# Patient Record
Sex: MALE | Race: BLACK OR AFRICAN AMERICAN | Employment: FULL TIME | URBAN - METROPOLITAN AREA
[De-identification: names, ages, dates, MRNs, and addresses within clinical notes are randomized per-mention and may not be internally consistent; named-entity substitution may affect disease eponyms.]

---

## 2022-01-09 PROCEDURE — 99285 EMERGENCY DEPT VISIT HI MDM: CPT

## 2022-01-09 PROCEDURE — 51798 US URINE CAPACITY MEASURE: CPT

## 2022-01-09 PROCEDURE — 51702 INSERT TEMP BLADDER CATH: CPT

## 2022-01-09 ASSESSMENT — PAIN DESCRIPTION - LOCATION: LOCATION: PELVIS

## 2022-01-09 ASSESSMENT — PAIN DESCRIPTION - PAIN TYPE: TYPE: ACUTE PAIN

## 2022-01-09 ASSESSMENT — PAIN SCALES - GENERAL: PAINLEVEL_OUTOF10: 7

## 2022-01-10 ENCOUNTER — HOSPITAL ENCOUNTER (EMERGENCY)
Age: 67
Discharge: HOME | End: 2022-01-10
Attending: STUDENT IN AN ORGANIZED HEALTH CARE EDUCATION/TRAINING PROGRAM
Payer: COMMERCIAL

## 2022-01-10 ENCOUNTER — APPOINTMENT (OUTPATIENT)
Dept: CT IMAGING | Age: 67
End: 2022-01-10
Payer: COMMERCIAL

## 2022-01-10 VITALS
HEART RATE: 61 BPM | SYSTOLIC BLOOD PRESSURE: 125 MMHG | OXYGEN SATURATION: 97 % | BODY MASS INDEX: 24.65 KG/M2 | TEMPERATURE: 98.2 F | DIASTOLIC BLOOD PRESSURE: 71 MMHG | WEIGHT: 182 LBS | RESPIRATION RATE: 18 BRPM | HEIGHT: 72 IN

## 2022-01-10 DIAGNOSIS — R33.9 URINARY RETENTION: Primary | ICD-10-CM

## 2022-01-10 DIAGNOSIS — N30.01 ACUTE CYSTITIS WITH HEMATURIA: ICD-10-CM

## 2022-01-10 LAB
ALBUMIN SERPL-MCNC: 3.6 GM/DL (ref 3.4–5)
ALP BLD-CCNC: 84 IU/L (ref 40–129)
ALT SERPL-CCNC: 25 U/L (ref 10–40)
ANION GAP SERPL CALCULATED.3IONS-SCNC: 9 MMOL/L (ref 4–16)
AST SERPL-CCNC: 24 IU/L (ref 15–37)
BACTERIA: ABNORMAL /HPF
BASOPHILS ABSOLUTE: 0 K/CU MM
BASOPHILS RELATIVE PERCENT: 0.1 % (ref 0–1)
BILIRUB SERPL-MCNC: 0.3 MG/DL (ref 0–1)
BILIRUBIN URINE: NEGATIVE MG/DL
BLOOD, URINE: 300
BUN BLDV-MCNC: 25 MG/DL (ref 6–23)
CALCIUM SERPL-MCNC: 9.2 MG/DL (ref 8.3–10.6)
CHLORIDE BLD-SCNC: 105 MMOL/L (ref 99–110)
CLARITY: ABNORMAL
CO2: 23 MMOL/L (ref 21–32)
COLOR: ABNORMAL
CREAT SERPL-MCNC: 1 MG/DL (ref 0.9–1.3)
DIFFERENTIAL TYPE: ABNORMAL
EOSINOPHILS ABSOLUTE: 0 K/CU MM
EOSINOPHILS RELATIVE PERCENT: 0 % (ref 0–3)
GFR AFRICAN AMERICAN: >60 ML/MIN/1.73M2
GFR NON-AFRICAN AMERICAN: >60 ML/MIN/1.73M2
GLUCOSE BLD-MCNC: 151 MG/DL (ref 70–99)
GLUCOSE, URINE: NEGATIVE MG/DL
HCT VFR BLD CALC: 46.7 % (ref 42–52)
HEMOGLOBIN: 15 GM/DL (ref 13.5–18)
IMMATURE NEUTROPHIL %: 0.2 % (ref 0–0.43)
KETONES, URINE: NEGATIVE MG/DL
LEUKOCYTE ESTERASE, URINE: ABNORMAL
LIPASE: 19 IU/L (ref 13–60)
LYMPHOCYTES ABSOLUTE: 0.4 K/CU MM
LYMPHOCYTES RELATIVE PERCENT: 4.7 % (ref 24–44)
MCH RBC QN AUTO: 31.2 PG (ref 27–31)
MCHC RBC AUTO-ENTMCNC: 32.1 % (ref 32–36)
MCV RBC AUTO: 97.1 FL (ref 78–100)
MONOCYTES ABSOLUTE: 0.3 K/CU MM
MONOCYTES RELATIVE PERCENT: 3.3 % (ref 0–4)
NITRITE URINE, QUANTITATIVE: NEGATIVE
NUCLEATED RBC %: 0 %
PDW BLD-RTO: 13.5 % (ref 11.7–14.9)
PH, URINE: 6 (ref 5–8)
PLATELET # BLD: 268 K/CU MM (ref 140–440)
PMV BLD AUTO: 8.9 FL (ref 7.5–11.1)
POTASSIUM SERPL-SCNC: 4.1 MMOL/L (ref 3.5–5.1)
PROTEIN UA: 300 MG/DL
RBC # BLD: 4.81 M/CU MM (ref 4.6–6.2)
RBC URINE: 2618 /HPF (ref 0–3)
SEGMENTED NEUTROPHILS ABSOLUTE COUNT: 7.4 K/CU MM
SEGMENTED NEUTROPHILS RELATIVE PERCENT: 91.7 % (ref 36–66)
SODIUM BLD-SCNC: 137 MMOL/L (ref 135–145)
SPECIFIC GRAVITY UA: 1.01 (ref 1–1.03)
SQUAMOUS EPITHELIAL: 2 /HPF
TOTAL IMMATURE NEUTOROPHIL: 0.02 K/CU MM
TOTAL NUCLEATED RBC: 0 K/CU MM
TOTAL PROTEIN: 6.8 GM/DL (ref 6.4–8.2)
UROBILINOGEN, URINE: NORMAL MG/DL (ref 0.2–1)
WBC # BLD: 8.1 K/CU MM (ref 4–10.5)
WBC UA: 250 /HPF (ref 0–2)

## 2022-01-10 PROCEDURE — 87186 SC STD MICRODIL/AGAR DIL: CPT

## 2022-01-10 PROCEDURE — 74177 CT ABD & PELVIS W/CONTRAST: CPT

## 2022-01-10 PROCEDURE — 85025 COMPLETE CBC W/AUTO DIFF WBC: CPT

## 2022-01-10 PROCEDURE — 83690 ASSAY OF LIPASE: CPT

## 2022-01-10 PROCEDURE — 87077 CULTURE AEROBIC IDENTIFY: CPT

## 2022-01-10 PROCEDURE — 6360000004 HC RX CONTRAST MEDICATION: Performed by: STUDENT IN AN ORGANIZED HEALTH CARE EDUCATION/TRAINING PROGRAM

## 2022-01-10 PROCEDURE — 81001 URINALYSIS AUTO W/SCOPE: CPT

## 2022-01-10 PROCEDURE — 6370000000 HC RX 637 (ALT 250 FOR IP): Performed by: STUDENT IN AN ORGANIZED HEALTH CARE EDUCATION/TRAINING PROGRAM

## 2022-01-10 PROCEDURE — 80053 COMPREHEN METABOLIC PANEL: CPT

## 2022-01-10 PROCEDURE — 87086 URINE CULTURE/COLONY COUNT: CPT

## 2022-01-10 RX ORDER — CEPHALEXIN 500 MG/1
500 CAPSULE ORAL 4 TIMES DAILY
Qty: 40 CAPSULE | Refills: 0 | Status: SHIPPED | OUTPATIENT
Start: 2022-01-10 | End: 2022-01-20

## 2022-01-10 RX ORDER — CEPHALEXIN 250 MG/1
500 CAPSULE ORAL ONCE
Status: COMPLETED | OUTPATIENT
Start: 2022-01-10 | End: 2022-01-10

## 2022-01-10 RX ADMIN — CEPHALEXIN 500 MG: 250 CAPSULE ORAL at 03:11

## 2022-01-10 RX ADMIN — IOPAMIDOL 75 ML: 755 INJECTION, SOLUTION INTRAVENOUS at 02:22

## 2022-01-10 NOTE — ED NOTES
This RN at bedside to complete bladder scan. >999mL per scanner.      Dorian Boogie RN  01/10/22 9140

## 2022-01-10 NOTE — ED NOTES
Pt states he has been unable to empty his bladder since this am. Pt states he has a history of enlarged prostate and has stage four prostate cancer. Pt denies any new medication with cancer treatments at this time.       Yaima Olivas RN  01/10/22 2197

## 2022-01-12 LAB
CULTURE: ABNORMAL
CULTURE: ABNORMAL
Lab: ABNORMAL
SPECIMEN: ABNORMAL

## 2022-01-19 NOTE — ED PROVIDER NOTES
Emergency Department Encounter    Patient: Jennifer Mendez  MRN: [id number]  : 1955  Date of Evaluation: 2022  ED Provider:  Arian Davila MD    Triage Chief Complaint:   Urinary Retention    [place]:  Jennifer Mendez is a 74 y.o. male presenting with complaints of urinary retention. Patient states he has a history of prostate cancer and is not undergoing any treatments at this time. Patient states he mostly uses holistic treatments. States he has been on vacation and has had significant change of diet because of that and in the past he had urinary retention if he does not follow a strict diet. States he does have a urologist up in [place] who came he can follow-up with closely. States he has not had any urine output since 9 AM and is feeling suprapubic abdominal discomfort. States he has had urinary retention Booth's in the past.  States this feels similar to prior episodes. States the discomfort is moderate, constant, stabbing without exacerbating relieving factors. Denies nausea vomiting, diarrhea constipation, blood or melena stools. Denies fevers or chills. Denies chest pain or shortness of breath, cough or sputum production. Denies headache, blurred vision, focal neurodeficits. Denies any recent falls or trauma. ROS - see HPI, below listed is current ROS at time of my eval:      Past Medical History:   Diagnosis Date    Prostate CA [facility])      Past Surgical History:   Procedure Laterality Date    LYMPH NODE DISSECTION       History reviewed. No pertinent family history.   Social History     Socioeconomic History    Marital status: Single     Spouse name: Not on file    Number of children: Not on file    Years of education: Not on file    Highest education level: Not on file   Occupational History    Not on file   Tobacco Use    Smoking status: Not on file    Smokeless tobacco: Not on file   Substance and Sexual Activity    Alcohol use: Not Currently    Drug use: Never    Sexual activity: Not on file   Other Topics Concern    Not on file   Social History Narrative    Not on file     Social Determinants of Health     Financial Resource Strain:     Difficulty of Paying Living Expenses: Not on file   Food Insecurity:     Worried About Running Out of Food in the Last Year: Not on file    Priscilla of Food in the Last Year: Not on file   Transportation Needs:     Lack of Transportation (Medical): Not on file    Lack of Transportation (Non-Medical): Not on file   Physical Activity:     Days of Exercise per Week: Not on file    Minutes of Exercise per Session: Not on file   Stress:     Feeling of Stress : Not on file   Social Connections:     Frequency of Communication with Friends and Family: Not on file    Frequency of Social Gatherings with Friends and Family: Not on file    Attends Faith Services: Not on file    Active Member of [address] [place] [address] [employer] or Organizations: Not on file    Attends Club or Organization Meetings: Not on file    Marital Status: Not on file   Intimate Partner Violence:     Fear of Current or Ex-Partner: Not on file    Emotionally Abused: Not on file    Physically Abused: Not on file    Sexually Abused: Not on file   Housing Stability:     Unable to Pay for Housing in the Last Year: Not on file    Number of [place] in the Last Year: Not on file    Unstable Housing in the Last Year: Not on file     No current facility-administered medications for this encounter.      Current Outpatient Medications   Medication Sig Dispense Refill    cephALEXin (KEFLEX) 500 MG capsule Take 1 capsule by mouth 4 times daily for 10 days 40 capsule 0     No Known Allergies    Nursing Notes Reviewed    Physical Exam:  Triage VS:    ED Triage Vitals [01/09/22 2230]   Enc Vitals Group      BP (!) 178/83      Pulse 111      Resp 18      Temp 98.1 °F (36.7 °C)      Temp Source Oral      SpO2 99 %      Weight 182 lb (82.6 kg)      Height 5' 11.5\" (1.816 m)      Head Circumference Peak Flow       Pain Score       Pain Loc       Pain Edu? Excl. in [address]? My pulse ox interpretation is - normal    General appearance:  No acute distress. Patient is very well-appearing  Skin:  Warm. Dry. Eye:  Extraocular movements intact. Ears, nose, mouth and throat:  Oral mucosa moist   Neck:  Trachea midline. Extremity:  No swelling. Normal ROM     Heart:  Regular rate and rhythm, normal S1 & S2, no extra heart sounds. Perfusion:  intact  Respiratory:  Lungs clear to auscultation bilaterally. Respirations nonlabored. Abdominal:  Normal bowel sounds. Soft. Suprapubic abdominal discomfort without rebound or guarding, no tenderness palpation any other region, no flank pain, no CVA tenderness, no central spinal tenderness. Back:  No CVA tenderness to palpation     Neurological:  Alert and oriented times 3. No focal neuro deficits.              Psychiatric:  Appropriate    I have reviewed and interpreted all of the currently available lab results from this visit (if applicable):  Results for orders placed or performed during the hospital encounter of 01/10/22   Culture, Urine    Specimen: Urine, clean catch   Result Value Ref Range    Specimen URINE CLEAN CATCH     Special Requests NONE     Culture Final Report     Culture ENTEROCOCCUS FAECALIS >100,000 CFU/ml (A)        Susceptibility    Enterococcus  faecalis - BACTERIAL SUSCEPTIBILITY PANEL VERA     ampicillin <=2 Sensitive      nitrofurantoin <=16 Sensitive      tetracycline <=1 Sensitive      vancomycin 1 Sensitive    CBC auto diff   Result Value Ref Range    WBC 8.1 4.0 - 10.5 K/CU MM    RBC 4.81 4.6 - 6.2 M/CU MM    Hemoglobin 15.0 13.5 - 18.0 GM/DL    Hematocrit 46.7 42 - 52 %    MCV 97.1 78 - 100 FL    MCH 31.2 (H) 27 - 31 PG    MCHC 32.1 32.0 - 36.0 %    RDW 13.5 11.7 - 14.9 %    Platelets 073 441 - 616 K/CU MM    MPV 8.9 7.5 - 11.1 FL    Differential Type AUTOMATED DIFFERENTIAL     Segs Relative 91.7 (H) 36 - 66 % Lymphocytes % 4.7 (L) 24 - 44 %    Monocytes % 3.3 0 - 4 %    Eosinophils % 0.0 0 - 3 %    Basophils % 0.1 0 - 1 %    Segs Absolute 7.4 K/CU MM    Lymphocytes Absolute 0.4 K/CU MM    Monocytes Absolute 0.3 K/CU MM    Eosinophils Absolute 0.0 K/CU MM    Basophils Absolute 0.0 K/CU MM    Nucleated RBC % 0.0 %    Total Nucleated RBC 0.0 K/CU MM    Total Immature Neutrophil 0.02 K/CU MM    Immature Neutrophil % 0.2 0 - 0.43 %   CMP   Result Value Ref Range    Sodium 137 135 - 145 MMOL/L    Potassium 4.1 3.5 - 5.1 MMOL/L    Chloride 105 99 - 110 mMol/L    CO2 23 21 - 32 MMOL/L    BUN 25 (H) 6 - 23 MG/DL    CREATININE 1.0 0.9 - 1.3 MG/DL    Glucose 151 (H) 70 - 99 MG/DL    Calcium 9.2 8.3 - 10.6 MG/DL    Albumin 3.6 3.4 - 5.0 GM/DL    Total Protein 6.8 6.4 - 8.2 GM/DL    Total Bilirubin 0.3 0.0 - 1.0 MG/DL    ALT 25 10 - 40 U/L    AST 24 15 - 37 IU/L    Alkaline Phosphatase 84 40 - 129 IU/L    GFR Non-African American >60 >60 mL/min/1.73m2    GFR African American >60 >60 mL/min/1.73m2    Anion Gap 9 4 - 16   Lipase   Result Value Ref Range    Lipase 19 13 - 60 IU/L   Urinalysis   Result Value Ref Range    Color, UA RED (A) YELLOW    Clarity, UA BLOODY (A) CLEAR    Glucose, Urine NEGATIVE NEGATIVE MG/DL    Bilirubin Urine NEGATIVE NEGATIVE MG/DL    Ketones, Urine NEGATIVE NEGATIVE MG/DL    Specific Gravity, UA 1.015 1.001 - 1.035    Blood, Urine 300 (A) NEGATIVE    pH, Urine 6.0 5.0 - 8.0    Protein,  (HH) NEGATIVE MG/DL    Urobilinogen, Urine NORMAL 0.2 - 1.0 MG/DL    Nitrite Urine, Quantitative NEGATIVE NEGATIVE    Leukocyte Esterase, Urine MODERATE (A) NEGATIVE    RBC, UA 2,618 (H) 0 - 3 /HPF    WBC,  (H) 0 - 2 /HPF    Bacteria, UA RARE (A) NEGATIVE /HPF    Squam Epithel, UA 2 /HPF      Radiographs (if obtained):  Radiologist's Report Reviewed:  CT ABDOMEN PELVIS W IV CONTRAST Additional Contrast? None    Result Date: 1/10/2022  EXAMINATION: CT OF THE ABDOMEN AND PELVIS WITH CONTRAST 1/10/2022 2:15 am TECHNIQUE: CT of the abdomen and pelvis was performed with the administration of intravenous contrast. Multiplanar reformatted images are provided for review. Dose modulation, iterative reconstruction, and/or weight based adjustment of the mA/kV was utilized to reduce the radiation dose to as low as reasonably achievable. COMPARISON: None. HISTORY: ORDERING SYSTEM PROVIDED HISTORY: abdominal pain TECHNOLOGIST PROVIDED HISTORY: Reason for exam:->abdominal pain Additional Contrast?->None Decision Support Exception - unselect if not a suspected or confirmed emergency medical condition->Emergency Medical Condition (MA) Reason for Exam: abdominal pain Additional signs and symptoms: Urinary Retention FINDINGS: Lower Chest: Small subsegmental densities in the bilateral lower lobes, due to atelectasis, aspiration, or pneumonia. Organs: A 1.1 cm hypodensity in the posterior segment of the right lobe of the liver, probably a cyst or hemangioma but too small to fully characterize. Unremarkable spleen, pancreas, and adrenals. Bilateral renal cortical hypodensities measuring up to 2 cm. These are likely simple cysts. Mild left-sided hydroureteronephrosis with fat stranding around the left kidney, likely due to recent passage of a stone. Acute pyelonephritis not excluded. No definite cholelithiasis. GI/Bowel: No CT evidence of acute appendicitis. Bowel loops nonobstructed. Pelvis: Irregular circumferential wall thickening of the urinary bladder with a Booth catheter in place. Multiple layering stones in the gallbladder measuring up to 1.2 cm. Prostate enlarged. Peritoneum/Retroperitoneum: No bulky adenopathy. Vascular calcification with no abdominal aortic aneurysm. No free air or free fluid. Bones/Soft Tissues: Multilevel thoracolumbar spondylosis. Mild osteoarthritis of the bilateral hip joints. Mild left-sided hydronephrosis with fat stranding around the left kidney, likely due to recent passage of a stone. Acute pyelonephritis cannot be excluded. Multiple layering stones in the gallbladder. Circumferential irregular wall thickening of the urinary bladder. Differential diagnosis include spastic bladder, chronic outlet obstruction, infiltrative neoplasms, and acute cystitis. Clinical correlation and further evaluation by cystoscopy as clinically indicated are recommended. RECOMMENDATIONS: Unavailable           MDM:    55-year-old male presenting with urinary retention. History and be seen above. Vitals on presentation patient is tachycardic to 111 but otherwise vitals are reassuring patient afebrile satting well on room air. Patient exam shows discomfort in the suprapubic region consistent with urinary retention and ultrasound reveals over a liter in the bladder and Booth is placed. Patient has significant improvement of symptoms after Booth is placed and heart rate improves. Remainder of exam is reassuring. CBC and CMP are reassuring. Lipase is within normal limits. UA has moderate leuk esterases, elevated whites with rare bacteria. Urine culture sent. Patient has no previous urine cultures. Patient states he feels back to baseline and feels safe for discharge. Patient states he is able to follow-up close with his urologist and primary physician. I discussed strict return precautions with patient. We will treat with antibiotics. Patient discharged home    Clinical Impression:  1. Urinary retention    2.  Acute cystitis with hematuria      Disposition referral (if applicable):    Call your urologist and primary care physician tomorrow to set up follow-up appointment within the next 1 to 2 days for reevaluation        [facility] Emergency Department  [address] [place] [address] [phone]    If symptoms worsen    Disposition medications (if applicable):  Discharge Medication List as of 1/10/2022  3:09 AM      START taking these medications Details   cephALEXin (KEFLEX) 500 MG capsule Take 1 capsule by mouth 4 times daily for 10 days, Disp-40 capsule, R-0Print           ED Provider Disposition Time  DISPOSITION Decision To Discharge 01/10/2022 03:01:42 AM        Comment: Please note this report has been produced using speech recognition software and may contain errors related to that system including errors in grammar, punctuation, and spelling, as well as words and phrases that may be inappropriate. Efforts were made to edit the dictations. Addendum: Following up on urine culture results. Urine culture grew out enteric coccus faecalis with sensitivity to ampicillin, Macrobid, tetracycline and vancomycin. I discussed these results with patient. Patient has a follow-up with urology tomorrow. Patient denies any fevers or chills, flank pain, back pain at this time. Patient states he will discuss these results with his urologist tomorrow. I did give patient my personal cell phone number if he has any difficulties or concerns.       Yimi Deleon MD  01/19/22 2175       Yimi Deleon MD  01/19/22 5457